# Patient Record
Sex: FEMALE | Race: WHITE | URBAN - METROPOLITAN AREA
[De-identification: names, ages, dates, MRNs, and addresses within clinical notes are randomized per-mention and may not be internally consistent; named-entity substitution may affect disease eponyms.]

---

## 2016-05-20 LAB
ALT SERPL-CCNC: 21 U/L (ref 8–45)
AST SERPL-CCNC: 20 U/L (ref 5–41)
CREAT SERPL-MCNC: 0.76 MG/DL (ref 0.57–1.11)
GFR SERPL CREATININE-BSD FRML MDRD: >60 ML/MIN/1.73M2
GLUCOSE SERPL-MCNC: 113 MG/DL (ref 70–100)
POTASSIUM SERPL-SCNC: 3.9 MMOL/L (ref 3.5–5.1)
TSH SERPL-ACNC: 1.44 UIU/ML (ref 0.47–5)

## 2017-01-03 ENCOUNTER — TRANSFERRED RECORDS (OUTPATIENT)
Dept: HEALTH INFORMATION MANAGEMENT | Facility: CLINIC | Age: 37
End: 2017-01-03

## 2017-03-03 ENCOUNTER — TRANSFERRED RECORDS (OUTPATIENT)
Dept: HEALTH INFORMATION MANAGEMENT | Facility: CLINIC | Age: 37
End: 2017-03-03

## 2017-10-16 ENCOUNTER — TRANSFERRED RECORDS (OUTPATIENT)
Dept: HEALTH INFORMATION MANAGEMENT | Facility: CLINIC | Age: 37
End: 2017-10-16

## 2017-10-19 ENCOUNTER — TRANSFERRED RECORDS (OUTPATIENT)
Dept: HEALTH INFORMATION MANAGEMENT | Facility: CLINIC | Age: 37
End: 2017-10-19

## 2018-03-24 ENCOUNTER — APPOINTMENT (OUTPATIENT)
Dept: GENERAL RADIOLOGY | Facility: CLINIC | Age: 38
End: 2018-03-24
Attending: FAMILY MEDICINE
Payer: COMMERCIAL

## 2018-03-24 ENCOUNTER — HOSPITAL ENCOUNTER (EMERGENCY)
Facility: CLINIC | Age: 38
Discharge: HOME | End: 2018-03-24
Attending: FAMILY MEDICINE | Admitting: FAMILY MEDICINE
Payer: COMMERCIAL

## 2018-03-24 VITALS
DIASTOLIC BLOOD PRESSURE: 89 MMHG | HEIGHT: 68 IN | WEIGHT: 200 LBS | BODY MASS INDEX: 30.31 KG/M2 | OXYGEN SATURATION: 97 % | SYSTOLIC BLOOD PRESSURE: 138 MMHG | TEMPERATURE: 98.4 F | RESPIRATION RATE: 16 BRPM

## 2018-03-24 DIAGNOSIS — M54.12 CERVICAL RADICULOPATHY: ICD-10-CM

## 2018-03-24 DIAGNOSIS — M25.50 PAIN IN JOINT, MULTIPLE SITES: ICD-10-CM

## 2018-03-24 DIAGNOSIS — M54.2 CERVICALGIA: ICD-10-CM

## 2018-03-24 LAB
ALBUMIN SERPL-MCNC: 4 G/DL (ref 3.4–5)
ALP SERPL-CCNC: 72 U/L (ref 40–150)
ALT SERPL W P-5'-P-CCNC: 45 U/L (ref 0–50)
ANION GAP SERPL CALCULATED.3IONS-SCNC: 9 MMOL/L (ref 3–14)
AST SERPL W P-5'-P-CCNC: 20 U/L (ref 0–45)
BASOPHILS # BLD AUTO: 0 10E9/L (ref 0–0.2)
BASOPHILS NFR BLD AUTO: 0.4 %
BILIRUB SERPL-MCNC: 0.2 MG/DL (ref 0.2–1.3)
BUN SERPL-MCNC: 12 MG/DL (ref 7–30)
CALCIUM SERPL-MCNC: 8.7 MG/DL (ref 8.5–10.1)
CHLORIDE SERPL-SCNC: 108 MMOL/L (ref 94–109)
CO2 SERPL-SCNC: 25 MMOL/L (ref 20–32)
CREAT SERPL-MCNC: 0.69 MG/DL (ref 0.52–1.04)
CRP SERPL-MCNC: <2.9 MG/L (ref 0–8)
DIFFERENTIAL METHOD BLD: NORMAL
EOSINOPHIL # BLD AUTO: 0.1 10E9/L (ref 0–0.7)
EOSINOPHIL NFR BLD AUTO: 1.1 %
ERYTHROCYTE [DISTWIDTH] IN BLOOD BY AUTOMATED COUNT: 13.5 % (ref 10–15)
ERYTHROCYTE [SEDIMENTATION RATE] IN BLOOD BY WESTERGREN METHOD: 9 MM/H (ref 0–20)
GFR SERPL CREATININE-BSD FRML MDRD: >90 ML/MIN/1.7M2
GLUCOSE SERPL-MCNC: 101 MG/DL (ref 70–99)
HCT VFR BLD AUTO: 41.4 % (ref 35–47)
HGB BLD-MCNC: 14.1 G/DL (ref 11.7–15.7)
IMM GRANULOCYTES # BLD: 0 10E9/L (ref 0–0.4)
IMM GRANULOCYTES NFR BLD: 0.2 %
LYMPHOCYTES # BLD AUTO: 2.8 10E9/L (ref 0.8–5.3)
LYMPHOCYTES NFR BLD AUTO: 32.9 %
MCH RBC QN AUTO: 29.8 PG (ref 26.5–33)
MCHC RBC AUTO-ENTMCNC: 34.1 G/DL (ref 31.5–36.5)
MCV RBC AUTO: 88 FL (ref 78–100)
MONOCYTES # BLD AUTO: 0.6 10E9/L (ref 0–1.3)
MONOCYTES NFR BLD AUTO: 7.2 %
NEUTROPHILS # BLD AUTO: 5 10E9/L (ref 1.6–8.3)
NEUTROPHILS NFR BLD AUTO: 58.2 %
PLATELET # BLD AUTO: 315 10E9/L (ref 150–450)
POTASSIUM SERPL-SCNC: 3.9 MMOL/L (ref 3.4–5.3)
PROT SERPL-MCNC: 7.6 G/DL (ref 6.8–8.8)
RBC # BLD AUTO: 4.73 10E12/L (ref 3.8–5.2)
SODIUM SERPL-SCNC: 142 MMOL/L (ref 133–144)
URATE SERPL-MCNC: 4.5 MG/DL (ref 2.6–6)
WBC # BLD AUTO: 8.5 10E9/L (ref 4–11)

## 2018-03-24 PROCEDURE — 86618 LYME DISEASE ANTIBODY: CPT | Performed by: FAMILY MEDICINE

## 2018-03-24 PROCEDURE — 96365 THER/PROPH/DIAG IV INF INIT: CPT | Performed by: FAMILY MEDICINE

## 2018-03-24 PROCEDURE — 96375 TX/PRO/DX INJ NEW DRUG ADDON: CPT | Performed by: FAMILY MEDICINE

## 2018-03-24 PROCEDURE — 99284 EMERGENCY DEPT VISIT MOD MDM: CPT | Mod: 25 | Performed by: FAMILY MEDICINE

## 2018-03-24 PROCEDURE — 86431 RHEUMATOID FACTOR QUANT: CPT | Performed by: FAMILY MEDICINE

## 2018-03-24 PROCEDURE — 84550 ASSAY OF BLOOD/URIC ACID: CPT | Performed by: FAMILY MEDICINE

## 2018-03-24 PROCEDURE — 85652 RBC SED RATE AUTOMATED: CPT | Performed by: FAMILY MEDICINE

## 2018-03-24 PROCEDURE — 85025 COMPLETE CBC W/AUTO DIFF WBC: CPT | Performed by: FAMILY MEDICINE

## 2018-03-24 PROCEDURE — 99284 EMERGENCY DEPT VISIT MOD MDM: CPT | Mod: Z6 | Performed by: FAMILY MEDICINE

## 2018-03-24 PROCEDURE — 86200 CCP ANTIBODY: CPT | Performed by: FAMILY MEDICINE

## 2018-03-24 PROCEDURE — 86140 C-REACTIVE PROTEIN: CPT | Performed by: FAMILY MEDICINE

## 2018-03-24 PROCEDURE — 25000128 H RX IP 250 OP 636: Performed by: FAMILY MEDICINE

## 2018-03-24 PROCEDURE — 86038 ANTINUCLEAR ANTIBODIES: CPT | Performed by: FAMILY MEDICINE

## 2018-03-24 PROCEDURE — 72040 X-RAY EXAM NECK SPINE 2-3 VW: CPT | Mod: TC

## 2018-03-24 PROCEDURE — 80053 COMPREHEN METABOLIC PANEL: CPT | Performed by: FAMILY MEDICINE

## 2018-03-24 RX ORDER — METHYLPREDNISOLONE 4 MG
TABLET, DOSE PACK ORAL
Qty: 21 TABLET | Refills: 0 | Status: SHIPPED | OUTPATIENT
Start: 2018-03-24

## 2018-03-24 RX ORDER — OXYCODONE HYDROCHLORIDE 5 MG/1
5 TABLET ORAL EVERY 6 HOURS PRN
Qty: 14 TABLET | Refills: 0 | Status: SHIPPED | OUTPATIENT
Start: 2018-03-24

## 2018-03-24 RX ORDER — SALSALATE 500 MG/1
1000 TABLET, FILM COATED ORAL 2 TIMES DAILY
Qty: 40 TABLET | Refills: 0 | Status: SHIPPED | OUTPATIENT
Start: 2018-03-24 | End: 2018-04-03

## 2018-03-24 RX ORDER — KETOROLAC TROMETHAMINE 30 MG/ML
30 INJECTION, SOLUTION INTRAMUSCULAR; INTRAVENOUS ONCE
Status: COMPLETED | OUTPATIENT
Start: 2018-03-24 | End: 2018-03-24

## 2018-03-24 RX ADMIN — KETOROLAC TROMETHAMINE 30 MG: 30 INJECTION, SOLUTION INTRAMUSCULAR at 17:29

## 2018-03-24 RX ADMIN — DEXAMETHASONE SODIUM PHOSPHATE 20 MG: 10 INJECTION, SOLUTION INTRAMUSCULAR; INTRAVENOUS at 17:34

## 2018-03-24 NOTE — ED AVS SNAPSHOT
[facility] Emergency Department    [address] [place] [address]    Phone:  [phone]    Fax:  [phone]                                       Suki Chanel   MRN: [id number]    Department:  [facility] Emergency Department   Date of Visit:  3/24/2018           After Visit Summary Signature Page     I have received my discharge instructions, and my questions have been answered. I have discussed any challenges I see with this plan with the nurse or doctor.    ..........................................................................................................................................  Patient/Patient Representative Signature      ..........................................................................................................................................  Patient Representative Print Name and Relationship to Patient    ..................................................               ................................................  Date                                            Time    ..........................................................................................................................................  Reviewed by Signature/Title    ...................................................              ..............................................  Date                                                            Time

## 2018-03-24 NOTE — ED NOTES
Right sided neck and shoulder pain for 10 days with pain to both knees and arms off and on for a couple years.  Has had scans and an MRI with no definitve diagnosis.

## 2018-03-24 NOTE — ED AVS SNAPSHOT
[facility] Emergency Department    [address] [place] [address]    Phone:  [phone]    Fax:  [phone]                                       Suki Chanel   MRN: [id number]    Department:  [facility] Emergency Department   Date of Visit:  3/24/2018           Patient Information     Date Of Birth          1980        Your diagnoses for this visit were:     Cervicalgia (neck pain)     Cervical radiculopathy     Pain in joint, multiple sites        You were seen by Connor Jones MD.      Follow-up Information     Follow up with Specialty clinic rheumatology.    Why:  [phone]        Follow up with [facility] Emergency Department.    Specialty:  EMERGENCY MEDICINE    Why:  If symptoms worsen    Contact information:    [address] [place] [address] [phone]    Additional information:    From [address]: Exit at [address] on [address] side of [place]. Turn right on [address]. Turn left at stoplight on [address]. [facility] will be in view two blocks ahead        Discharge Instructions       We are sorry you are having severe neck and bilateral arm pain. This may be from cervical radiculopathy.    Because of your history of flare-ups of polyarthritis involving multiple joints, we are rechecking labs to see if you may have inflammatory arthritis such as rheumatoid arthritis.    Some of these labs will take several days to return.    The following medications were given during your stay: Toradol, Decadron    Begin salsalate 1000 mg 3 times a day as needed for pain.  Take this instead of ibuprofen.    Reserve Vicodin for breakthrough pain.  Begin Medrol Dosepak take as directed for the next 7 days.    The final results of some tests are pending. We will call you if your plan of care needs to change.     Call our Specialty Scheduling number ([phone]) to make an appointment with rheumatology within the next  month.    After discharge, please closely monitor for any new or worsening symptoms. Return to the Emergency Department at any time if your symptoms worsen.        24 Hour Appointment Hotline       To make an appointment at any [facility] clinic, call [phone] ([phone]). If you don't have a family doctor or clinic, we will help you find one. [facility] clinics are conveniently located to serve the needs of you and your family.             Review of your medicines      START taking        Dose / Directions Last dose taken    methylPREDNISolone 4 MG tablet   Commonly known as:  MEDROL DOSEPAK   Quantity:  21 tablet        Take as instructed on the package   Refills:  0        oxyCODONE IR 5 MG tablet   Commonly known as:  ROXICODONE   Dose:  5 mg   Quantity:  14 tablet        Take 1 tablet (5 mg) by mouth every 6 hours as needed for breakthrough pain   Refills:  0        salsalate 500 MG tablet   Commonly known as:  DISALCID   Dose:  1000 mg   Quantity:  40 tablet        Take 2 tablets (1,000 mg) by mouth 2 times daily for 10 days   Refills:  0          Our records show that you are taking the medicines listed below. If these are incorrect, please call your family doctor or clinic.        Dose / Directions Last dose taken    IBUPROFEN PO   Dose:  1000 mg        Take 1,000 mg by mouth   Refills:  0                Prescriptions were sent or printed at these locations (3 Prescriptions)                   [facility] - [address] [address]    Telephone:  [phone]   Fax:  [phone]   Hours:                  E-Prescribed (2 of 3)         salsalate (DISALCID) 500 MG tablet               methylPREDNISolone (MEDROL DOSEPAK) 4 MG tablet                 Printed at Department/Unit printer (1 of 3)         oxyCODONE IR (ROXICODONE) 5 MG tablet                Procedures and tests performed during your visit     Anti Nuclear [place] IgG by IFA  "with Reflex    CBC with platelets differential    CRP inflammation    Cervical spine XR, 2-3 views    Comprehensive metabolic panel    Cyclic Citrullinated Peptide Antibody IgG    Erythrocyte sedimentation rate auto    Lyme Disease [place] with reflex to WB Serum    Peripheral IV catheter    Uric acid      Orders Needing Specimen Collection     None      Pending Results     Date and Time Order Name Status Description    3/24/2018 1758 Lyme Disease [place] with reflex to WB Serum In process     3/24/2018 1758 Anti Nuclear [place] IgG by IFA with Reflex In process     3/24/2018 1758 Cyclic Citrullinated Peptide Antibody IgG In process     3/24/2018 1758 Uric acid In process     3/24/2018 1758 CRP inflammation In process     3/24/2018 1758 Comprehensive metabolic panel In process             Pending Culture Results     No orders found from 3/22/2018 to 3/25/2018.            Pending Results Instructions     If you had any lab results that were not finalized at the time of your Discharge, you can call the ED Lab Result RN at [phone]. You will be contacted by this team for any positive Lab results or changes in treatment. The nurses are available 7 days a week from 10A to 6:30P.  You can leave a message 24 hours per day and they will return your call.        Thank you for choosing [facility]       Thank you for choosing [facility] for your care. Our goal is always to provide you with excellent care. Hearing back from our patients is one way we can continue to improve our services. Please take a few minutes to complete the written survey that you may receive in the mail after you visit with us. Thank you!        [employer] Information     [employer] lets you send messages to your doctor, view your test results, renew your prescriptions, schedule appointments and more. To sign up, go to [email or web address] . Click on \"Log in\" on the left side of the screen, which will take you to the Welcome page. Then click on \"Sign up Now\" on the " right side of the page.     You will be asked to enter the access code listed below, as well as some personal information. Please follow the directions to create your username and password.     Your access code is: [email or web address]  Expires: 2018  6:15 PM     Your access code will  in 90 days. If you need help or a new code, please call your [facility] clinic or [phone].        Care EveryWhere ID     This is your Care EveryWhere ID. This could be used by other organizations to access your [facility] medical records  [id number]        Equal Access to Services     [facility] : Elodia Denton, patrica lam, michelle black, lili suazo . So [facility] [phone].    ATENCIÓN: Si habla español, tiene a carias disposición servicios gratuitos de asistencia lingüística. Llame al [phone].    We comply with applicable federal civil rights laws and [place] laws. We do not discriminate on the basis of race, color, national origin, age, disability, sex, sexual orientation, or gender identity.            After Visit Summary       This is your record. Keep this with you and show to your community pharmacist(s) and doctor(s) at your next visit.

## 2018-03-24 NOTE — ED PROVIDER NOTES
"                                                            [facility] ED Provider Note   CC:     Chief Complaint   Patient presents with     Shoulder Pain     HPI:  Suki Chanel is a 38 year old female with a history of varicella, depression, anxiety, GERD, and osteoarthritis of both knees who presents to the ED complaining of neck pain. Patient reports she has experienced ankle, knee, hip, back, neck, shoulder, elbow, and wrist pain since her last pregnancy. Patient states her joint pain is intermittent and rotates from her ankles to her knees onto other joints. These flare ups of joint pain typically disappear after a couple weeks but this flare up in her neck started two weeks ago and is getting worse. Her neck pain is most severe and was at its worst last night. Her pain last night felt like it was moving into her jaw. Turning her head increases her neck pain. Along with her neck pain, she is newly experiencing right shoulder and bilateral elbow and wrist pain. Patient states the pain typically stays in the neck, she has never had arm/shoulder pain. This is the worst flare up the patient has ever had. Her most previous flare up was a month ago with her ankles. Over the years, Suki has tried warm bathes, icing, chiropractor, electrical therapy, Bengay, and Biofreeze with no relief. She has experienced relief when lying on the couch for multiple days but doesn't have time for it. Her pain gets worse throughout the day. Her pain is currently 9/10. Exerting herself will cause flare ups. Lifting her arms will aggravate neck pain. Patient is also complaining of a \"swollen and puffy\" feeling like her body is heavy and retaining fluid although her extremities do not appear swollen. This sensation is throughout her arms and legs. Patient had pitting swelling during her last pregnancy. Patient gained 70 lbs during her previous pregnancy and has lost 30 lbs since.    Patient denies fever, chills, " "diaphoresis, trouble swallowing, chest pain, shortness of breath, abdominal symptoms, bowel changes, and urine issues. She experiences acid reflux. She gets migraines and takes ibuprofen for the pain. She is currently taking ibuprofen, Tylenol, a daily vitamin, and Osteo Bi-flex. Patient has had x-rays and MRI's on her knees with no diagnosis. She has never seen a rheumatologist. Patient denies use of tobacco or alcohol. She is avoiding carb foods and pastas. She sees her PCP in [place], Dr. Macario. She is allergic to codeine.     Of note, patient has had a positive lyme's test 2 years ago, was put on medications, and was retested negative. She does not recall a tick bite. Patient denies thyroid issues. Patient has had injections to her back (SI joint) less than 6 months ago. Patient is unable to recall any significant family history of medical issues. Patient has had her tubes tied and an ablation done as she has heavy periods.    Problem List:  There are no active problems to display for this patient.      MEDS:   Previous Medications    IBUPROFEN PO    Take 1,000 mg by mouth       ALLERGIES:    Allergies   Allergen Reactions     Codeine      vomiting       Past Surgical History:   Procedure Laterality Date     ABDOMEN SURGERY       GYN SURGERY         Social History   Substance Use Topics     Smoking status: Never Smoker     Smokeless tobacco: Never Used     Alcohol use No         Review of Systems   Except as noted in HPI, all other systems were reviewed and are negative    Physical Exam     Vitals were reviewed  Patient Vitals for the past 8 hrs:   BP Temp Temp src Heart Rate Resp SpO2 Height Weight   03/24/18 1617 138/89 98.4  F (36.9  C) Oral 104 16 97 % 1.727 m (5' 8\") 90.7 kg (200 lb)     GENERAL APPEARANCE: Alert, no respiratory difficulty, slightly tearful at times  FACE: normal facies  EYES: Pupils are equal some: Sclerae nonicteric  HENT: normal external exam; oral exam is benign  NECK: no adenopathy or " asymmetry; patient has a slight hump on the cervical spine; patient has limited flexion, extension and rotation.  More limitation with turning to the left  RESP: normal respiratory effort; clear breath sounds bilaterally  CV: regular rate and rhythm; no significant murmurs, gallops or rubs  ABD: soft, no tenderness; no rebound or guarding; bowel sounds are normal  MS: no gross deformities noted; normal muscle tone.  No muscle atrophy; no tenderness in the mid to low back region.  EXT: No calf tenderness or pitting edema; no objective synovitis; hip, knee and ankle joints are all without synovitis.  SKIN: no worrisome rash  NEURO: no facial droop; no focal deficits, speech is normal  PSYCH: normal mood and affect      Available Lab/Imaging Results     Results for orders placed or performed during the hospital encounter of 03/24/18 (from the past 24 hour(s))   CBC with platelets differential   Result Value Ref Range    WBC 8.5 4.0 - 11.0 10e9/L    RBC Count 4.73 3.8 - 5.2 10e12/L    Hemoglobin 14.1 11.7 - 15.7 g/dL    Hematocrit 41.4 35.0 - 47.0 %    MCV 88 78 - 100 fl    MCH 29.8 26.5 - 33.0 pg    MCHC 34.1 31.5 - 36.5 g/dL    RDW 13.5 10.0 - 15.0 %    Platelet Count 315 150 - 450 10e9/L    Diff Method Automated Method     % Neutrophils 58.2 %    % Lymphocytes 32.9 %    % Monocytes 7.2 %    % Eosinophils 1.1 %    % Basophils 0.4 %    % Immature Granulocytes 0.2 %    Absolute Neutrophil 5.0 1.6 - 8.3 10e9/L    Absolute Lymphocytes 2.8 0.8 - 5.3 10e9/L    Absolute Monocytes 0.6 0.0 - 1.3 10e9/L    Absolute Eosinophils 0.1 0.0 - 0.7 10e9/L    Absolute Basophils 0.0 0.0 - 0.2 10e9/L    Abs Immature Granulocytes 0.0 0 - 0.4 10e9/L   Erythrocyte sedimentation rate auto   Result Value Ref Range    Sed Rate 9 0 - 20 mm/h   Cervical spine XR, 2-3 views    Narrative    CERVICAL SPINE THREE VIEWS  3/24/2018 5:43 PM     HISTORY: Neck pain.     COMPARISON: None.      Impression    IMPRESSION: Normal.    JOSE MARTIN LONG MD    Comprehensive metabolic panel   Result Value Ref Range    Sodium 142 133 - 144 mmol/L    Potassium 3.9 3.4 - 5.3 mmol/L    Chloride 108 94 - 109 mmol/L    Carbon Dioxide 25 20 - 32 mmol/L    Anion Gap 9 3 - 14 mmol/L    Glucose 101 (H) 70 - 99 mg/dL    Urea Nitrogen 12 7 - 30 mg/dL    Creatinine 0.69 0.52 - 1.04 mg/dL    GFR Estimate >90 >60 mL/min/1.7m2    GFR Estimate If Black >90 >60 mL/min/1.7m2    Calcium 8.7 8.5 - 10.1 mg/dL    Bilirubin Total 0.2 0.2 - 1.3 mg/dL    Albumin 4.0 3.4 - 5.0 g/dL    Protein Total 7.6 6.8 - 8.8 g/dL    Alkaline Phosphatase 72 40 - 150 U/L    ALT 45 0 - 50 U/L    AST 20 0 - 45 U/L   CRP inflammation   Result Value Ref Range    CRP Inflammation <2.9 0.0 - 8.0 mg/L   Uric acid   Result Value Ref Range    Uric Acid 4.5 2.6 - 6.0 mg/dL         Impression     Final diagnoses:   Cervicalgia (neck pain)   Cervical radiculopathy   Pain in joint, multiple sites       ED Course & Medical Decision Making   Suki Chanel is a 38 year old female who presented to the emergency department with 2+ weeks of neck pain associated with bilateral arm pain, sensation of swelling, and tingling, right arm worse than left.  Patient states that she has had multiple joint involvement over the past couple of years after she delivered her baby.  She has been told that her symptoms are related to postpartum causes, and she has had extensive workup individually for her knees, back, hip and abdomen.  She was diagnosed with having a benign adrenal adenoma and has had MRI scans of her back, abdomen, and both knees.  She has multiple ganglion cysts in the right knee and a few on the left.  She has a meniscus injury and one of her knees.  She had a recent cortisone injection in the left SI joint.  Patient states that her pains usually last several days and it usually goes away.  This time the pain in the neck has been going on for over 2 weeks.  She describes an initial sensation of stiffness, and now pain  radiating into both arms.  Pain is extremely uncomfortable and she was not able to sleep last night.  She does not have any obvious synovitis on her exam.  She has tenderness at the base of the neck with limited range of motion.  It is likely that she has a cervical radiculopathy, but we are unable to obtain an MRI scan today.  I initiated a rheumatologic workup including CBC, comprehensive metabolic panel, ESR, CRP, Lyme's titer, uric acid, antinuclear antibody testing.  The patient reports a prior history of positive Lyme's titer.  Patient had been taking ibuprofen at home which is not helping.  The patient's exam did not reveal any myelopathy or muscle weakness.  Her other joints do not have any acute reactive inflammation on clinical exam.  Thus far her CBC and sed rate are normal.  CRP, uric acid and comprehensive metabolic panel are normal.  Rheumatoid factor and antinuclear antibody tests are pending.  A C-spine x-ray was personally reviewed by me and reveal no abnormalities.  Patient received IV Toradol and 20 mg of Decadron in the emergency department.  We offered her more medication but she has to drive home so she is not a candidate for any opiate medication.  I will have her begin salsalate 1000 mg 2 times a day for 10 days.  Reserve oxycodone for breakthrough pain.  Begin Medrol Dosepak and take as directed.  Follow-up with rheumatology within the next month if possible.  Return to see her primary care provider or establish care here at [facility] if she chooses.  Return to the ED at any time if symptoms worsen.  We'll notify her of any abnormal labs that are pending.        [place] Vicodinys. Written after-visit summary and instructions were given at the time of discharge.      New Prescriptions    METHYLPREDNISOLONE (MEDROL DOSEPAK) 4 MG TABLET    Take as instructed on the package    OXYCODONE IR (ROXICODONE) 5 MG TABLET    Take 1 tablet (5 mg) by mouth every 6 hours as needed for breakthrough pain     SALSALATE (DISALCID) 500 MG TABLET    Take 2 tablets (1,000 mg) by mouth 2 times daily for 10 days     This document serves as a record of services personally performed by Connor Jones MD. It was created on their behalf by Kwesi Mcgrath, a trained medical scribe. The creation of this record is based on the provider's personal observations and the statements of the patient. This document has been checked and approved by the attending provider.    This note was completed in part using Dragon voice recognition, and may contain word and grammatical errors.        Connor Jones MD  03/24/18 5077

## 2018-03-24 NOTE — DISCHARGE INSTRUCTIONS
We are sorry you are having severe neck and bilateral arm pain. This may be from cervical radiculopathy.    Because of your history of flare-ups of polyarthritis involving multiple joints, we are rechecking labs to see if you may have inflammatory arthritis such as rheumatoid arthritis.    Some of these labs will take several days to return.    The following medications were given during your stay: Toradol, Decadron    Begin salsalate 1000 mg 2 times a day as needed for pain.  Take this instead of ibuprofen.    Reserve Vicodin for breakthrough pain.  Begin Medrol Dosepak take as directed for the next 7 days.    The final results of some tests are pending. We will call you if your plan of care needs to change.     Call our Specialty Scheduling number ([phone]) to make an appointment with rheumatology within the next month.    After discharge, please closely monitor for any new or worsening symptoms. Return to the Emergency Department at any time if your symptoms worsen.

## 2018-03-25 LAB — B BURGDOR IGG+IGM SER QL: 0.05 (ref 0–0.89)

## 2018-03-26 ENCOUNTER — TELEPHONE (OUTPATIENT)
Dept: RHEUMATOLOGY | Facility: CLINIC | Age: 38
End: 2018-03-26

## 2018-03-26 LAB
ANA SER QL IF: NEGATIVE
CCP AB SER IA-ACNC: 1 U/ML
RHEUMATOID FACT SER NEPH-ACNC: <20 IU/ML (ref 0–20)

## 2018-03-26 NOTE — TELEPHONE ENCOUNTER
Health Call Center    Phone Message    May a detailed message be left on voicemail: yes    Reason for Call: Other: [facility] ED is calling stating that the patient was referred to see Rheum by the ED doctor but ED doctors do not put in referrals per the ED nurse. Nursing staff is wondering since labs were done and the AVS states she needs to see Rheum if this would be enough? Please advisel     Action Taken: Message routed to:  Adult Clinics: Rheumatology p [phone]

## 2018-03-26 NOTE — TELEPHONE ENCOUNTER
Spoke with patient, she is scheduled to see Dr. Raphael on 4/12/18 at 8:00 a.m. Patient has an appointment with her PCP on 4/2/18 and will have him send the referral as well as medical records for the patient's appointment. I did let her know that Dr. Raphael does need the records prior to her appointment and the patient was informed by the  ER staff that Care Everywhere is also an option, and the patient stated she would sign the forms for [facility] to access her records while at the appointment visit with her PCP. I    The patient will call and cancel the appointment if it does not work or if her PCP will not refer her to [facility]. The patient stated her main concerns have been joint pain that travels throughout her body on occasions, she can have pain and then feel better or worse, and it has been 2-3 yrs if not longer that she has been experiencing this. She said her visit to the  ER was the worst its ever been, she tries to work through it as best she can without going to receive medical help but now its getting to the point of exhaustion.    Faye Esteban  [facility] [place]~Specialty/Med Surg   [phone]

## 2018-03-26 NOTE — TELEPHONE ENCOUNTER
Called and spoke to patient. She states her insur has changed recently, and will be changing primary care physicians due to that. She would like to be seen for ongoing pain that flares in legs and knees. States has been dealing with this for 2-3 yrs and has had many tests and xrays done she reports, and nobody seems to find anything she feels. Was referred by ED to be seen in Rheumatology. Since she has had a recent change with her insurance, advised to check with them for Rheumatology here in [place] to be in plan. Also advised  f/u with PCP from ED as discussed by ED physician, and will need referral from PCP. Will have scheduling contact patient to be scheduled accordingly. She understands will need rcds faxed/sent to Rheumatology for appt.

## 2018-03-30 ENCOUNTER — HEALTH MAINTENANCE LETTER (OUTPATIENT)
Age: 38
End: 2018-03-30

## 2018-04-02 ENCOUNTER — TRANSFERRED RECORDS (OUTPATIENT)
Dept: HEALTH INFORMATION MANAGEMENT | Facility: CLINIC | Age: 38
End: 2018-04-02

## 2018-04-05 ENCOUNTER — PRE VISIT (OUTPATIENT)
Dept: RHEUMATOLOGY | Facility: CLINIC | Age: 38
End: 2018-04-05

## 2018-04-05 RX ORDER — MULTIVITAMIN,THERAPEUTIC
1 TABLET ORAL DAILY
COMMUNITY

## 2018-04-05 NOTE — TELEPHONE ENCOUNTER
Called and left a message for patient to call back for previsit intake prior top appt scheduled with Dr Raphael 4/12/18 8a.       PREVISIT INFORMATION                                                    Suki RITCHIE Chanel scheduled for future visit at [facility] specialty clinics.    Patient is scheduled to see Dr Raphael on   Thurs 4/12/18  Reason for visit: Consult for Joint Pain  Referring provider Dr Macario  Has patient seen previous specialist? no  Medical Records:  Pt has already requested rcds be faxed from [facility]    REVIEW                                                      New patient packet mailed to patient: Yes  Medication reconciliation complete: Yes      Current Outpatient Prescriptions   Medication Sig Dispense Refill     IBUPROFEN PO Take 1,000 mg by mouth       oxyCODONE IR (ROXICODONE) 5 MG tablet Take 1 tablet (5 mg) by mouth every 6 hours as needed for breakthrough pain 14 tablet 0     methylPREDNISolone (MEDROL DOSEPAK) 4 MG tablet Take as instructed on the package 21 tablet 0       Allergies: Codeine      Patient review:  Who is currently managing your care (update PCP if needed)? Dr Macario    Are you currently taking any medications to manage your rheumatology condition? No   If not, have you previously taken any rheumatology medications like DMARD or biologic (type, dates, length of tx, effective or not)? No      Are you taking any medications over-the-counter? Yes     Update home medications and allergies info: Yes     Have you had any recent rheumatology labs? Yes   Last lab results in chart:    Hemoglobin   Date Value Ref Range Status   03/24/2018 14.1 11.7 - 15.7 g/dL Final     Urea Nitrogen   Date Value Ref Range Status   03/24/2018 12 7 - 30 mg/dL Final     Sed Rate   Date Value Ref Range Status   03/24/2018 9 0 - 20 mm/h Final     CRP Inflammation   Date Value Ref Range Status   03/24/2018 <2.9 0.0 - 8.0 mg/L Final     AST   Date Value Ref Range  Status   03/24/2018 20 0 - 45 U/L Final     Albumin   Date Value Ref Range Status   03/24/2018 4.0 3.4 - 5.0 g/dL Final     Alkaline Phosphatase   Date Value Ref Range Status   03/24/2018 72 40 - 150 U/L Final     ALT   Date Value Ref Range Status   03/24/2018 45 0 - 50 U/L Final     Rheumatoid Factor   Date Value Ref Range Status   03/24/2018 <20 <20 IU/mL Final     Recent Labs   Lab Test  03/24/18   1758  03/24/18   1724   WBC   --   8.5   HGB   --   14.1   HCT   --   41.4   MCV   --   88   PLT   --   315   BUN  12   --    AST  20   --    ALT  45   --    ALKPHOS  72   --        No results found for: COLOR, APPEARANCE, URINEGLC, URINEBILI, URINEKETONE, SG, URINEPH, PROTEIN, UROBILINOGEN, NITRITE, LEUKEST    Have you had any recent x-rays related to your visit? Yes   Pt will have xray reports of hips, knees and low back faxed to us.   Are your immunizations up-to-date? Yes    Do you have copy of your immunizations? N/A     There is no immunization history on file for this patient.        PLAN/FOLLOW-UP NEEDED                                                      Previsit review complete.  Patient will see provider at future scheduled appointment.     Patient Reminders Given:  Please, make sure you bring an updated list of your medications.   Please, present 15 minutes early.  If you need to cancel or reschedule,please call [phone].

## 2018-04-09 ENCOUNTER — TRANSFERRED RECORDS (OUTPATIENT)
Dept: HEALTH INFORMATION MANAGEMENT | Facility: CLINIC | Age: 38
End: 2018-04-09

## 2018-04-12 ENCOUNTER — OFFICE VISIT (OUTPATIENT)
Dept: RHEUMATOLOGY | Facility: CLINIC | Age: 38
End: 2018-04-12
Payer: COMMERCIAL

## 2018-04-12 ENCOUNTER — RESULTS ONLY (OUTPATIENT)
Dept: OTHER | Facility: CLINIC | Age: 38
End: 2018-04-12

## 2018-04-12 VITALS
DIASTOLIC BLOOD PRESSURE: 81 MMHG | SYSTOLIC BLOOD PRESSURE: 120 MMHG | HEART RATE: 65 BPM | BODY MASS INDEX: 31.55 KG/M2 | WEIGHT: 208.2 LBS | OXYGEN SATURATION: 99 % | TEMPERATURE: 98.2 F | HEIGHT: 68 IN

## 2018-04-12 DIAGNOSIS — M19.90 INFLAMMATORY ARTHRITIS: Primary | ICD-10-CM

## 2018-04-12 DIAGNOSIS — M72.2 PLANTAR FASCIITIS: ICD-10-CM

## 2018-04-12 LAB
HBV CORE AB SERPL QL IA: NONREACTIVE
HBV SURFACE AG SERPL QL IA: NONREACTIVE
HCV AB SERPL QL IA: NONREACTIVE

## 2018-04-12 PROCEDURE — 86480 TB TEST CELL IMMUN MEASURE: CPT | Performed by: STUDENT IN AN ORGANIZED HEALTH CARE EDUCATION/TRAINING PROGRAM

## 2018-04-12 PROCEDURE — 86803 HEPATITIS C AB TEST: CPT | Performed by: STUDENT IN AN ORGANIZED HEALTH CARE EDUCATION/TRAINING PROGRAM

## 2018-04-12 PROCEDURE — 81374 HLA I TYPING 1 ANTIGEN LR: CPT | Performed by: STUDENT IN AN ORGANIZED HEALTH CARE EDUCATION/TRAINING PROGRAM

## 2018-04-12 PROCEDURE — 36415 COLL VENOUS BLD VENIPUNCTURE: CPT | Performed by: STUDENT IN AN ORGANIZED HEALTH CARE EDUCATION/TRAINING PROGRAM

## 2018-04-12 PROCEDURE — 86704 HEP B CORE ANTIBODY TOTAL: CPT | Performed by: STUDENT IN AN ORGANIZED HEALTH CARE EDUCATION/TRAINING PROGRAM

## 2018-04-12 PROCEDURE — 87340 HEPATITIS B SURFACE AG IA: CPT | Performed by: STUDENT IN AN ORGANIZED HEALTH CARE EDUCATION/TRAINING PROGRAM

## 2018-04-12 PROCEDURE — 99204 OFFICE O/P NEW MOD 45 MIN: CPT | Performed by: STUDENT IN AN ORGANIZED HEALTH CARE EDUCATION/TRAINING PROGRAM

## 2018-04-12 RX ORDER — PREDNISONE 5 MG/1
TABLET ORAL
Qty: 60 TABLET | Refills: 2 | Status: SHIPPED | OUTPATIENT
Start: 2018-04-12

## 2018-04-12 ASSESSMENT — PAIN SCALES - GENERAL: PAINLEVEL: NO PAIN (0)

## 2018-04-12 NOTE — NURSING NOTE
"Suki DUGAN Juddeda's goals for this visit include:   She requests these members of her care team be copied on today's visit information:     PCP: Osiel Macario    Referring Provider:  Osiel MARIEE [facility]  [address] [place] [address]    Chief Complaint   Patient presents with     Consult     Consult for joint pain        Initial /81  Pulse 65  Temp 98.2  F (36.8  C) (Oral)  Ht 1.727 m (5' 8\")  Wt 94.4 kg (208 lb 3.2 oz)  SpO2 99%  BMI 31.66 kg/m2 Estimated body mass index is 31.66 kg/(m^2) as calculated from the following:    Height as of this encounter: 1.727 m (5' 8\").    Weight as of this encounter: 94.4 kg (208 lb 3.2 oz).  Medication Reconciliation: complete  "

## 2018-04-12 NOTE — MR AVS SNAPSHOT
After Visit Summary   4/12/2018    Suki Chanel    MRN: [id number]           Patient Information     Date Of Birth          1980        Visit Information        Provider Department      4/12/2018 8:00 AM Atilio Raphael MD [facility]        Today's Diagnoses     Inflammatory arthritis    -  1      Care Instructions    -- prednisone taper as directed     -- Blood tests today     -- RTC in 7 weeks           Follow-ups after your visit        Follow-up notes from your care team     Return in about 7 weeks (around 5/31/2018).      Your next 10 appointments already scheduled     May 31, 2018  9:00 AM CDT   Return Visit with Atilio Raphael MD   [facility]    [address] [place] [id number] [phone]              Who to contact     If you have questions or need follow up information about today's clinic visit or your schedule please contact [facility] directly at [phone].  Normal or non-critical lab and imaging results will be communicated to you by [employer], letter or phone within 4 business days after the clinic has received the results. If you do not hear from us within 7 days, please contact the clinic through [employer] or phone. If you have a critical or abnormal lab result, we will notify you by phone as soon as possible.  Submit refill requests through [employer] or call your pharmacy and they will forward the refill request to us. Please allow 3 business days for your refill to be completed.          Additional Information About Your Visit        [employer] Information     [employer] gives you secure access to your electronic health record. If you see a primary care provider, you can also send messages to your care team and make appointments. If you have questions, please call your primary care clinic.  If you do not have a primary care provider, please call [phone] and they will  "assist you.      [employer] is an electronic gateway that provides easy, online access to your medical records. With [employer], you can request a clinic appointment, read your test results, renew a prescription or communicate with your care team.     To access your existing account, please contact your [facility] or call [phone] for assistance.        Care EveryWhere ID     This is your Care EveryWhere ID. This could be used by other organizations to access your [facility] medical records  [id number]        Your Vitals Were     Pulse Temperature Height Pulse Oximetry BMI (Body Mass Index)       65 98.2  F (36.8  C) (Oral) 1.727 m (5' 8\") 99% 31.66 kg/m2        Blood Pressure from Last 3 Encounters:   04/12/18 120/81   03/24/18 138/89    Weight from Last 3 Encounters:   04/12/18 94.4 kg (208 lb 3.2 oz)   03/24/18 90.7 kg (200 lb)              We Performed the Following     Hepatitis B core antibody     Hepatitis B surface antigen     Hepatitis C antibody     HLA-B27 Typing     M Tuberculosis by Quantiferon          Today's Medication Changes          These changes are accurate as of 4/12/18  8:53 AM.  If you have any questions, ask your nurse or doctor.               Start taking these medicines.        Dose/Directions    predniSONE 5 MG tablet   Commonly known as:  DELTASONE   Used for:  Inflammatory arthritis   Started by:  Atilio Raphael MD        4tab=20 mg qd x 5 days, 3tab=15 mg qd x 5 days, 2tab=10 mg qd x 5 days, 1 tab=5 mg qd   Quantity:  60 tablet   Refills:  2            Where to get your medicines      These medications were sent to [id number] - [place], [place] - [address]  [address], [facility] [address]     Phone:  [phone]     predniSONE 5 MG tablet                Primary Care Provider Office Phone # Fax #    Osiel SANCHEZ Renaldo [phone]       [facility] [address]  [facility] [address]        Equal Access to Services     [facility] : Hadii stephanie madrigal mariama Sotroy, waaxda luqadaha, qaybta kabenjiemamon madridtonyammon, lili toribio hayrajesh jiménezcorisera navas. So [facility] [phone].    ATENCIÓN: Si habla español, tiene a carias disposición servicios gratuitos de asistencia lingüística. Yusefame al [phone].    We comply with applicable federal civil rights laws and [place] laws. We do not discriminate on the basis of race, color, national origin, age, disability, sex, sexual orientation, or gender identity.            Thank you!     Thank you for choosing [facility]  for your care. Our goal is always to provide you with excellent care. Hearing back from our patients is one way we can continue to improve our services. Please take a few minutes to complete the written survey that you may receive in the mail after your visit with us. Thank you!             Your Updated Medication List - Protect others around you: Learn how to safely use, store and throw away your medicines at www.disposemymeds.org.          This list is accurate as of 4/12/18  8:53 AM.  Always use your most recent med list.                   Brand Name Dispense Instructions for use Diagnosis    IBUPROFEN PO      Take 1,000 mg by mouth        methylPREDNISolone 4 MG tablet    MEDROL DOSEPAK    21 tablet    Take as instructed on the package        multivitamin, therapeutic Tabs tablet      Take 1 tablet by mouth daily        OSTEO BI-FLEX JOINT SHIELD Tabs           oxyCODONE IR 5 MG tablet    ROXICODONE    14 tablet    Take 1 tablet (5 mg) by mouth every 6 hours as needed for breakthrough pain        predniSONE 5 MG tablet    DELTASONE    60 tablet    4tab=20 mg qd x 5 days, 3tab=15 mg qd x 5 days, 2tab=10 mg qd x 5 days, 1 tab=5 mg qd    Inflammatory arthritis

## 2018-04-12 NOTE — PROGRESS NOTES
Rheumatology Clinic Visit     Suki Chanel MRN# [id number]   YOB: 1980 Age: 38 year old     Date of Visit: April 12, 2018  Primary care provider: Osiel Macario          Assessment and Plan:   Assessment     -- Polyarthritis   -- Plantar fasciitis   -- Elbow - lateral medial epicondylitis   -- Negative RF,ACPA, TINO,Lyme serology  -- Normal ESR,CRP  -- MRI pelvis - 10/17 - Normal.     Ms Chanel is 38-year-old female seen in the clinic for evaluation of joint pains.    Polyarthritis: She has pain in bilateral knees, ankles, hips, elbows and neck since 2016.  Pain is getting worse.  It comes as flareups of severe pain limited her daily activities. On 3/24/18 she presented to the emergency department with worsening pain in her joints, all her autoimmune workup done including inflammatory markers, rheumatoid serologies, Lyme serology, TINO came back normal.  Cervical x-ray was also normal.  She was given Medrol Dosepak and anti-inflammatory which helped.  She noticed decreased swelling in her hands along with decreased stiffness.    Physical exam and clinical history raise concern for inflammatory arthritis especially seronegative spondylarthritis.  She has history of bilateral plantar fasciitis, lateral and medial epicondylitis which can be seen in spondylarthritis patient.  She also has a family history of Crohn's disease in her grandmother.  We will get HLA-B27 level today.  Will get hepatitis and TB screen in anticipation of starting her on DMARD therapy.    Since she has noticed some improvement with the Medrol Dosepak in the past I will give her  prednisone taper to see if she notice improvement.  In case she noticed marked improvement with the prednisone taper,  inflammatory arthritis will be likely.  I will follow her back in 7 weeks and will consider starting sulfasalazine for possible spondylarthritis.      Chronic diarrhea : She was also concerned about chronic diarrhea, nausea and  flatulence issues.  With family history of Crohn's disease she is worried about Crohn's colitis.  She is also concerned about particular food allergies.  I recommended her to follow-up with her PCP and consider seeing an allergist and gastroenterologist in case her GI symptoms do not improve.    Plan     -- Prednisone taper as directed. 4tab=20 mg qd x 5 days, 3tab=15 mg qd x 5 days, 2tab=10 mg qd x 5 days, 1 tab=5 mg qd.    -- Blood tests today - hepatitis and TB screening, HLA-B27    -- RTC in 7 weeks           Active Problem List:     Patient Active Problem List    Diagnosis Date Noted     Polyarthritis      Priority: Medium     Bilateral plantar fasciitis      Priority: Medium            History of Present Illness:   Suki Chanel is a 38 year old female with PMH of varicella, depression, anxiety, GERD seen in the clinic in consultation at request of Osiel Macario for evaluation of joint pains.    She developed pain mostly in her knees and ankles in 2016 after the birth of her child.  Since then the joint pains have worsened.  Now she has pain in bilateral elbows, neck and hips as well.  She has morning stiffness which lasts for about an hour.  She was seen in the emergency department on 3/24/18 for worsening pain.  She had difficulty moving her head and getting out of bed.  She had basic autoimmune workup including rheumatoid serologies, inflammatory markers, Lyme serology, TINO, CBC, CMP which all came back normal.  She had x-ray of cervical spine which was also unremarkable.  She was given Medrol Dosepak, anti-inflammatory and oxycodone.  She did notice improvement with these meds but is not sure if it was related to Medrol Dosepak or an anti-inflammatory.    She gets frequent flareups of joint pains once a month which lasts for about  2 weeks. Pain gets to 8/10 in intensity and limits her daily activities.     She also has history of bilateral plantar fasciitis and medial and lateral epicondylitis.   Denies any history of dactylitis, uveitis.  She has family history of Crohn's disease but denies any blood in her stools.  She does have chronic diarrhea, nausea and flatulence issues but has never seen a gastroenterologist. She feels it is related to certain foods    She had MRI of the lumbar spine which revealed mild degenerative facet arthropathy in the martinez-facet edema on the left at L4-L5. She received lumbar para facet joint injections in 11/2017 which helped to some extent.  She had MRI pelvis in October 2017 which showed no evidence of sacroiliitis.  She had MRI of bilateral knees which revealed no synovitis or joint effusions. Very mild osteoarthritis seen.      Family hx of arthritis in her mother. Grand mother has Crohn's disease.     No history of psoriasis, ulcerative colitis or chron's disease. No h/o iritis, enthesitis, finger or toe swelling like dactylitis. Denies any raynauds, malar rash, photosensitivity, recurrent mouth/genital ulcers, sicca symptoms, pleuritic chest pains, recurrent sinusitis/rhinitis, swallowing difficulty, hearing or visual changes recently. No h/o arterial/venous thrombosis in the past. Denies any tick bite, recent GI/ infection.             Review of Systems:   Review Of Systems  Constitutional: denies fever, chills, night sweats and weight loss.  Skin: No skin rash.  Eyes: No dryness or irritation in eyes. No episode of eye inflammation or redness.   Ears/Nose/Throat: no recurrent sinus infections.  Respiratory: No shortness of breath, dyspnea on exertion, cough, or hemoptysis  Cardiovascular: no chest pain or palpitations.  Gastrointestinal: no nausea, vomiting, abdominal pain.  Normal bowel movements.  Genitourinary: no dysuria, frequency  or hematuria.  Musculoskeletal: as in HPI  Neurologic: no numbness, tingling.  Psychiatric: no mood disorders.  Hematologic/Lymphatic/Immunologic: no history of easy bruising, petechia or purpura.  No abnormal bleeding.   Endocrine:  "no h/o thyroid disease or Diabetes.                  Past Medical History:     Past Medical History:   Diagnosis Date     Bilateral plantar fasciitis      Polyarthritis      Past Surgical History:   Procedure Laterality Date     ABDOMEN SURGERY       GYN SURGERY              Social History:     Social History     Occupational History     Not on file.     Social History Main Topics     Smoking status: Never Smoker     Smokeless tobacco: Never Used     Alcohol use No     Drug use: No     Sexual activity: Not on file            Family History:     Family History   Problem Relation Age of Onset     Crohn Disease Maternal Grandmother             Allergies:     Allergies   Allergen Reactions     Codeine      vomiting            Medications:     Current Outpatient Prescriptions   Medication Sig Dispense Refill     predniSONE (DELTASONE) 5 MG tablet 4tab=20 mg qd x 5 days, 3tab=15 mg qd x 5 days, 2tab=10 mg qd x 5 days, 1 tab=5 mg qd 60 tablet 2     multivitamin, therapeutic (THERA-VIT) TABS tablet Take 1 tablet by mouth daily       Misc Natural Products (OSTEO BI-FLEX JOINT SHIELD) TABS        IBUPROFEN PO Take 1,000 mg by mouth       oxyCODONE IR (ROXICODONE) 5 MG tablet Take 1 tablet (5 mg) by mouth every 6 hours as needed for breakthrough pain (Patient not taking: Reported on 4/5/2018) 14 tablet 0     methylPREDNISolone (MEDROL DOSEPAK) 4 MG tablet Take as instructed on the package (Patient not taking: Reported on 4/5/2018) 21 tablet 0            Physical Exam:   Blood pressure 120/81, pulse 65, temperature 98.2  F (36.8  C), temperature source Oral, height 1.727 m (5' 8\"), weight 94.4 kg (208 lb 3.2 oz), SpO2 99 %.  Wt Readings from Last 4 Encounters:   04/12/18 94.4 kg (208 lb 3.2 oz)   03/24/18 90.7 kg (200 lb)       Constitutional: obese, appearing stated age; cooperative  Eyes: nl EOM, PERRLA, vision, conjunctiva, sclera  ENT: nl external ears, nose, hearing, lips, teeth, gums, throat  No mucous membrane lesions, " normal saliva pool  Neck: no mass or thyroid enlargement  Resp: lungs clear to auscultation, nl to palpation  CV: RRR, no murmurs, rubs or gallops, no edema  GI: no ABD mass or tenderness, no HSM  : not tested  Lymph: no cervical, supraclavicular, inguinal or epitrochlear nodes    MS: All TMJ, neck, shoulder, elbow, wrist, MCP/PIP/DIP, spine, hip, knee, ankle, and foot MTP/IP joints were examined.   -- Tenderness over B/L lateral and medial epicondyles, Tenderness over lower lumbar back and SI joints  -- Tenderness over left knee, no knee effusion, b/l ankle tenderness, no MTP joint tenderness.   -- Left heel tenderness, left plantar fasciitis.   -- No tenderness over MCP, PIP, DIP joints or wrists.   -- No dactylitis,  Tenosynovitis.     Skin: no nail pitting, alopecia, rash, nodules or lesions  Neuro: nl cranial nerves, strength, sensation, DTRs.   Psych: nl judgement, orientation, memory, affect.         Data:     Results for orders placed or performed during the hospital encounter of 03/24/18   Cervical spine XR, 2-3 views    Narrative    CERVICAL SPINE THREE VIEWS  3/24/2018 5:43 PM     HISTORY: Neck pain.     COMPARISON: None.      Impression    IMPRESSION: Normal.    JOSE MARTIN LONG MD   CBC with platelets differential   Result Value Ref Range    WBC 8.5 4.0 - 11.0 10e9/L    RBC Count 4.73 3.8 - 5.2 10e12/L    Hemoglobin 14.1 11.7 - 15.7 g/dL    Hematocrit 41.4 35.0 - 47.0 %    MCV 88 78 - 100 fl    MCH 29.8 26.5 - 33.0 pg    MCHC 34.1 31.5 - 36.5 g/dL    RDW 13.5 10.0 - 15.0 %    Platelet Count 315 150 - 450 10e9/L    Diff Method Automated Method     % Neutrophils 58.2 %    % Lymphocytes 32.9 %    % Monocytes 7.2 %    % Eosinophils 1.1 %    % Basophils 0.4 %    % Immature Granulocytes 0.2 %    Absolute Neutrophil 5.0 1.6 - 8.3 10e9/L    Absolute Lymphocytes 2.8 0.8 - 5.3 10e9/L    Absolute Monocytes 0.6 0.0 - 1.3 10e9/L    Absolute Eosinophils 0.1 0.0 - 0.7 10e9/L    Absolute Basophils 0.0 0.0 - 0.2 10e9/L     Abs Immature Granulocytes 0.0 0 - 0.4 10e9/L   Erythrocyte sedimentation rate auto   Result Value Ref Range    Sed Rate 9 0 - 20 mm/h   Comprehensive metabolic panel   Result Value Ref Range    Sodium 142 133 - 144 mmol/L    Potassium 3.9 3.4 - 5.3 mmol/L    Chloride 108 94 - 109 mmol/L    Carbon Dioxide 25 20 - 32 mmol/L    Anion Gap 9 3 - 14 mmol/L    Glucose 101 (H) 70 - 99 mg/dL    Urea Nitrogen 12 7 - 30 mg/dL    Creatinine 0.69 0.52 - 1.04 mg/dL    GFR Estimate >90 >60 mL/min/1.7m2    GFR Estimate If Black >90 >60 mL/min/1.7m2    Calcium 8.7 8.5 - 10.1 mg/dL    Bilirubin Total 0.2 0.2 - 1.3 mg/dL    Albumin 4.0 3.4 - 5.0 g/dL    Protein Total 7.6 6.8 - 8.8 g/dL    Alkaline Phosphatase 72 40 - 150 U/L    ALT 45 0 - 50 U/L    AST 20 0 - 45 U/L   CRP inflammation   Result Value Ref Range    CRP Inflammation <2.9 0.0 - 8.0 mg/L   Uric acid   Result Value Ref Range    Uric Acid 4.5 2.6 - 6.0 mg/dL   Cyclic Citrullinated Peptide Antibody IgG   Result Value Ref Range    Cyclic Citrullinated Peptide Antibody, IgG 1 <7 U/mL   Anti Nuclear [place] IgG by IFA with Reflex   Result Value Ref Range    TINO interpretation Negative NEG^Negative   Lyme Disease [place] with reflex to WB Serum   Result Value Ref Range    Lyme Disease Antibodies Serum 0.05 0.00 - 0.89   Rheumatoid factor   Result Value Ref Range    Rheumatoid Factor <20 <20 IU/mL       Recent Labs   Lab Test  03/24/18   1758  03/24/18   1724   WBC   --   8.5   RBC   --   4.73   HGB   --   14.1   HCT   --   41.4   MCV   --   88   RDW   --   13.5   PLT   --   315   ALBUMIN  4.0   --    CRP  <2.9   --    BUN  12   --       No results for input(s): TSH, T4 in the last [id number] hours.  Hemoglobin   Date Value Ref Range Status   03/24/2018 14.1 11.7 - 15.7 g/dL Final     Urea Nitrogen   Date Value Ref Range Status   03/24/2018 12 7 - 30 mg/dL Final     Sed Rate   Date Value Ref Range Status   03/24/2018 9 0 - 20 mm/h Final     CRP Inflammation   Date Value Ref Range Status    03/24/2018 <2.9 0.0 - 8.0 mg/L Final     AST   Date Value Ref Range Status   03/24/2018 20 0 - 45 U/L Final     Albumin   Date Value Ref Range Status   03/24/2018 4.0 3.4 - 5.0 g/dL Final     Alkaline Phosphatase   Date Value Ref Range Status   03/24/2018 72 40 - 150 U/L Final     ALT   Date Value Ref Range Status   03/24/2018 45 0 - 50 U/L Final     Rheumatoid Factor   Date Value Ref Range Status   03/24/2018 <20 <20 IU/mL Final     Recent Labs   Lab Test  03/24/18   1758  03/24/18   1724   WBC   --   8.5   HGB   --   14.1   HCT   --   41.4   MCV   --   88   PLT   --   315   BUN  12   --    AST  20   --    ALT  45   --    ALKPHOS  72   --      Other studies:   Component      Latest Ref Rng & Units 3/24/2018   Sed Rate      0 - 20 mm/h 9   CRP Inflammation      0.0 - 8.0 mg/L <2.9   Uric Acid      2.6 - 6.0 mg/dL 4.5   Cyclic Citrullinated Peptide Antibody, IgG      <7 U/mL 1   TINO interpretation      NEG:Negative Negative   Lyme Disease Antibodies Serum      0.00 - 0.89 0.05   Rheumatoid Factor      <20 IU/mL <20     Outside studies reviewed:   Result Narrative   -------------------------------------- Original Report --------------------------------------     EXAM:  MRI OF THE LEFT KNEE WITHOUT CONTRAST     CLINICAL INFORMATION         Patient History:  Female, 36 years old, presents with pain and swelling involving both the   anterior and posterior aspects of the knees.  Difficulty walking and bending at the knees.    Symptoms for 14 months.  No specific injury.       Left Knee Surgery:  None.          Provider History:  Chronic pains of both knees.       TECHNICAL INFORMATION           Protocol:  MRI of the left knee was performed using a combination of T1-weighted, Proton   Density, T2-weighted and/or STIR images.           Sedation/Analgesia:  None.       COMPARISON:  Radiographs 5/23/2016     ____________________________________________________________________     CONCLUSION      1.  Minimal signal  degeneration of the body of the medial meniscus without discrete tear.     2.  Appearance of a vertically oriented posterior peripheral tear of the posterior horn of the   lateral meniscus.     3.  Region of grade II chondromalacia of the medial femoral condyle.     4.  Mild chondromalacia in the patellofemoral compartment.     5.  Focal bone marrow edema of the proximal tibia at the proximal tibiofibular articulation is   nonspecific, but could represent an early subchondral cyst in the setting of multiple adjacent   soft tissue ganglion cysts.     6.  There is a grouping of small ganglion cyst that arises from the proximal tibiofibular   articulation, extending into the proximal extensor digitorum and fibularis longus musculature.    Similar cysts are on the right side, also appearing more prominent on the right side.     Result Narrative   -------------------------------------- Original Report --------------------------------------     EXAM:  MRI OF THE RIGHT KNEE WITHOUT CONTRAST     CLINICAL INFORMATION         Patient History:  Female, 36 years old, presents with knee pain and swelling.  Anterior and   posterior.  Difficulty walking and bending the knees.  Symptoms for 14 months.  No specific   injury.       Right Knee Surgery:  None.          Provider History:  Chronic pains of both knees.       TECHNICAL INFORMATION           Protocol:  MRI of the right knee was performed using a combination of T1-weighted, Proton   Density, T2-weighted and/or STIR images.           Sedation/Analgesia:  None.       COMPARISON:  Radiographs 5/23/2016     ____________________________________________________________________     CONCLUSION      1.  Very small vertically oriented tear of the far periphery of the inferior articular surface   of the posteromedial corner of the medial meniscus.     2.  Prominent region of grade II to III chondromalacia of the central articular surface of the   medial femoral condyle.     3.  Chronic  sprain of medial collateral ligament without disruption.     4.  Grouping of periarticular ganglion cysts surrounding the proximal tibiofibular joint.  The   cysts are multiloculated and elongated, extending into the extensor digitorum muscle.  The   cysts are present on the opposite knee, but are more prominent on the right knee.     Please see the opposite knee MRI from the same day, which is separately reported.      EXAM:  MRI OF THE PELVIS WITHOUT CONTRAST     CLINICAL INFORMATION         Patient History:  37 years old Female with left-sided pelvic and hip pain.  Symptoms for 2   years.  No prior trauma.         Relevant Surgery:  None.          Provider History:  Chronic left-sided low back pain with left-sided sciatica.  Chronic left   sacroiliac joint pain.     TECHNICAL INFORMATION           Protocol:  MRI of the pelvis was performed using a combination of T1-weighted, Proton   Density, T2-weighted and/or STIR images.       Sedation/Analgesia:  None.       COMPARISON:  None.     _____________________________________________________________     CONCLUSION      1.  Expansion of red marrow hematopoiesis within the visualized bones.     2.  Otherwise unremarkable MRI of the pelvis.     3.  Specifically, no abnormalities are identified of bilateral sacroiliac joints or hip joints.      4.  No acute or chronic myotendinous injuries are identified nor is there evidence of   significant bursitis.     5.  No masses or mass effect are seen on the left sciatic nerve or its more proximal nerve   roots.     Reviewed Rheumatology lab flowsheet    Atilio Raphael MD  [facility]  Department of Rheumatology & Autoimmune Disorders  [facility]: [phone]   Pager - [phone]

## 2018-04-13 LAB — HLA-B27 QL NAA+PROBE: NORMAL

## 2018-04-16 LAB
M TB TUBERC IFN-G BLD QL: NEGATIVE
M TB TUBERC IFN-G/MITOGEN IGNF BLD: 0.12 IU/ML

## 2018-04-19 LAB
B LOCUS: NORMAL
B27TEST METHOD: NORMAL

## 2019-09-28 ENCOUNTER — HEALTH MAINTENANCE LETTER (OUTPATIENT)
Age: 39
End: 2019-09-28

## 2021-01-09 ENCOUNTER — HEALTH MAINTENANCE LETTER (OUTPATIENT)
Age: 41
End: 2021-01-09

## 2021-10-23 ENCOUNTER — HEALTH MAINTENANCE LETTER (OUTPATIENT)
Age: 41
End: 2021-10-23

## 2022-02-12 ENCOUNTER — HEALTH MAINTENANCE LETTER (OUTPATIENT)
Age: 42
End: 2022-02-12

## 2022-10-09 ENCOUNTER — HEALTH MAINTENANCE LETTER (OUTPATIENT)
Age: 42
End: 2022-10-09

## 2023-02-18 ENCOUNTER — HEALTH MAINTENANCE LETTER (OUTPATIENT)
Age: 43
End: 2023-02-18

## 2024-03-16 ENCOUNTER — HEALTH MAINTENANCE LETTER (OUTPATIENT)
Age: 44
End: 2024-03-16